# Patient Record
Sex: FEMALE | Race: WHITE | Employment: FULL TIME | URBAN - METROPOLITAN AREA
[De-identification: names, ages, dates, MRNs, and addresses within clinical notes are randomized per-mention and may not be internally consistent; named-entity substitution may affect disease eponyms.]

---

## 2020-12-29 ENCOUNTER — HOSPITAL ENCOUNTER (OUTPATIENT)
Dept: GENERAL RADIOLOGY | Age: 45
Discharge: HOME | End: 2020-12-31
Payer: COMMERCIAL

## 2020-12-29 ENCOUNTER — HOSPITAL ENCOUNTER (OUTPATIENT)
Age: 45
Discharge: HOME | End: 2020-12-31
Payer: COMMERCIAL

## 2020-12-29 DIAGNOSIS — M54.2 CERVICALGIA: ICD-10-CM

## 2020-12-29 PROCEDURE — 72050 X-RAY EXAM NECK SPINE 4/5VWS: CPT

## 2024-02-09 ENCOUNTER — OFFICE VISIT (OUTPATIENT)
Dept: SURGICAL ONCOLOGY | Facility: HOSPITAL | Age: 49
End: 2024-02-09
Payer: COMMERCIAL

## 2024-02-09 VITALS
RESPIRATION RATE: 16 BRPM | OXYGEN SATURATION: 99 % | HEART RATE: 89 BPM | SYSTOLIC BLOOD PRESSURE: 126 MMHG | DIASTOLIC BLOOD PRESSURE: 75 MMHG | WEIGHT: 178.57 LBS | TEMPERATURE: 97.5 F

## 2024-02-09 DIAGNOSIS — C43.9 MELANOMA (MULTI): Primary | ICD-10-CM

## 2024-02-09 PROCEDURE — 99215 OFFICE O/P EST HI 40 MIN: CPT | Mod: 57 | Performed by: SURGERY

## 2024-02-09 PROCEDURE — 99205 OFFICE O/P NEW HI 60 MIN: CPT | Performed by: SURGERY

## 2024-02-09 RX ORDER — DESVENLAFAXINE SUCCINATE 25 MG/1
50 TABLET, EXTENDED RELEASE ORAL
Status: ON HOLD | COMMUNITY
Start: 2023-08-18 | End: 2024-02-19 | Stop reason: ALTCHOICE

## 2024-02-09 RX ORDER — METFORMIN HYDROCHLORIDE 500 MG/5ML
500 SOLUTION ORAL NIGHTLY
Status: ON HOLD | COMMUNITY
Start: 2009-02-09 | End: 2024-02-19 | Stop reason: ALTCHOICE

## 2024-02-09 ASSESSMENT — PAIN SCALES - GENERAL: PAINLEVEL: 0-NO PAIN

## 2024-02-09 NOTE — PROGRESS NOTES
History and Physical    Referring Provider:  Dr. Dora Kevin DO    Chief Complaint:  Right chest melanoma  Right back moderately dysplastic nevus    History of Present Illness:  This is a 48 y.o. female who presents with a right chest melanoma that is 0.9mm in Breslow depth and non-ulcerated.  She had a right chest lesion the appear to have changed.  This was identified by family member as concerning and she had a biopsy with Dr. John that identified a melanoma.  She also had a biopsy on her right back that was moderately dysplastic nevus.  No prior history of skin cancers    Past Medical History:  PCOS  Thyroid goiter  Smoker    Past Surgical History:  Uterine ablation  Neck injections    Medications:  No current outpatient medications     Allergies:  No known drug allergies    Family History:  Mother with multiple myeloma    Social History:   reports that she has been smoking cigarettes. She has been smoking an average of .25 packs per day. She has never used smokeless tobacco. She reports current alcohol use of about 1.0 standard drink of alcohol per week. She reports that she does not use drugs.     Review of Systems:  A complete 12 point review of systems was performed and is negative except as noted in the history of present illness.    Vital Signs:  Vitals:    02/09/24 1302   BP: 126/75   Pulse: 89   Resp: 16   Temp: 36.4 °C (97.5 °F)   SpO2: 99%        Physical Exam:  GEN: No acute distress, Healthy appearing  HEENT: Moist mucus membranes, normocephalic  CARDS: RRR  PULM: No respiratory distress  GI: Soft, non-distended  LYMPH: No palpable lymphadenopathy in bilateral cervical, and right axillary basins  SKIN: Right anterior chest biopsy site without evidence of residual disease.  No surrounding lesions or nodularity.  Right back biopsy site with surrounding macule without any gross concerns  NEURO: No gross sensorimotor deficits  EXT: No arm or leg swelling      Imaging:  I have  personally reviewed the images and the radiologist's report.       Assessment:  This is a 48 y.o. female who presents with a right chest melanoma that was 0.9mm and Non-ulcerated. No clinically palpable lymphadenopathy  Discussed the recommendation for wide excision with consideration of a SLNB.     Plan:  -- Wide excision of the right chest melanoma with 1cm margin and SLNB  ALSO wide excision right back moderately dysplastic nevus  -- The patient asked very appropriate questions that were answered to the best of my ability with the current information at hand. She knows to call with any questions or concerns that arise      Jose Eduardo Nails MD, MPH

## 2024-02-12 ENCOUNTER — LAB REQUISITION (OUTPATIENT)
Dept: DERMATOPATHOLOGY | Facility: CLINIC | Age: 49
End: 2024-02-12
Payer: COMMERCIAL

## 2024-02-12 DIAGNOSIS — L98.8 OTHER SPECIFIED DISORDERS OF THE SKIN AND SUBCUTANEOUS TISSUE: ICD-10-CM

## 2024-02-12 PROCEDURE — 88321 CONSLTJ&REPRT SLD PREP ELSWR: CPT | Performed by: DERMATOLOGY

## 2024-02-12 PROCEDURE — 88321 CONSLTJ&REPRT SLD PREP ELSWR: CPT

## 2024-02-14 ENCOUNTER — TELEPHONE (OUTPATIENT)
Dept: PREADMISSION TESTING | Facility: HOSPITAL | Age: 49
End: 2024-02-14
Payer: COMMERCIAL

## 2024-02-14 NOTE — TELEPHONE ENCOUNTER
Pre-procedure PAT phone assessment completed. Pre-operative and medication instructions reviewed with patient. Instructed to hold metformin evening before surgery. Patient verbalizes understanding of instructions. SURGERY PRE-OPERATIVE INSTRUCTIONS    *You will receive a phone call the day before your procedure  after 2pm, (or the Friday before your surgery if scheduled on a Monday.) Generally the hospital will be calling you with this information after that time.    *You are not to eat after midnight the night before the surgery. You may have 8oz of a clear liquid up until 2 hours prior to arriving to the hospital. The exception is with medications you were instructed to take day of surgery.    *You may take tylenol for pain/discomfort as needed.     *Stop taking all aspirin products, ibuprofen (motrin/advil), naproxen (aleve/naprosyn) for one week prior to surgery.    *Stop taking all vitamins and supplements one week prior to surgery.     *You should not have alcoholic beverages for 24 hours before surgery.     *You should not smoke 24 hours prior to surgery.     *To help prevent surgical infections bathe/shower with Dial soap the evening before surgery.    *You can wear deodorant but no lotion, powder, or perfume/cologne. You should remove all make-up and nail polish at home.    *If you wear glasses, please bring a case for the glasses with you.    *You will be asked to remove dentures and contacts.     *Please leave all valuables at home.    *You should wear loose, comfortable clothing that will accommodate bandages and/or casts.    *You should notify your doctor of any change in your condition (fever, cold, rash, etc). Surgery may need to be re-scheduled until a time you are in better health.    *A responsible adult is required to accompany you to and from the hospital if you are receiving anesthesia or a sedative. Patients are not permitted to drive for 24 hours after anesthesia.     *You can use the   parking if you wish.     *If you have any further questions please call [phone].

## 2024-02-15 ENCOUNTER — ANESTHESIA EVENT (OUTPATIENT)
Dept: OPERATING ROOM | Facility: HOSPITAL | Age: 49
End: 2024-02-15
Payer: COMMERCIAL

## 2024-02-15 DIAGNOSIS — C43.9 MELANOMA OF SKIN (MULTI): Primary | ICD-10-CM

## 2024-02-15 LAB
PATH REPORT.FINAL DX SPEC: NORMAL
PATH REPORT.GROSS SPEC: NORMAL
PATH REPORT.MICROSCOPIC SPEC OTHER STN: NORMAL
PATH REPORT.RELEVANT HX SPEC: NORMAL
PATH REPORT.TOTAL CANCER: NORMAL
PATHOLOGY SYNOPTIC REPORT: NORMAL

## 2024-02-15 NOTE — TUMOR BOARD NOTE
General Patient Information  Name:  Bee Angel  Evaluation #:  1  Conference Date:  2-  YOB: 1975  MRN:  [id number]  Program Physician(s):  David Card  Referring Physician(s):  Jose Eduardo Callejas      Summary   Stage:  Kori ([id number]) ; Melanoma 5 year survival: 99%    Assessment:  Right medial superior chest with a non-ulcerated superficial spreading melanoma, Breslow: 0.6 mm (outside pathology read as a Breslow depth of 0.9 mm).    Recommendation:  WLE with 1 cm margins.    Review Multidisciplinary Cutaneous Oncology Conference recommendation with patient.  Continue routine follow up and total body skin exams with Dilcia Augustin.    Follow Up:  Jose Eduardo Callejas      History and Physical Exam  Dermatologic History:   48 y.o. female with a biopsy of the right medial superior chest on 1- showing a non-ulcerated melanoma, superficial spreading type, Breslow: 0.6 mm (outside pathology read as a Breslow depth of 0.9 mm).    Past Medical History:    Past Medical History:   Diagnosis Date    Goiter     Melanoma (CMS/HCC)     right chest    PCOS (polycystic ovarian syndrome)     Wears contact lenses     Wears glasses        Family History of DNS/MM:  Mother with multiple myeloma    Skin:  Right anterior chest biopsy site without evidence of residual disease.  No surrounding lesions or nodularity.  Right back biopsy site with surrounding macule without any gross concerns.    Lymphatic:  No palpable lymphadenopathy in bilateral cervical, and right axillary basins.      Pathology  Derm Consult: [id number]  4 SLIDES, [facility], #[id number], BX: 1/25/24     A. SKIN, RIGHT MEDIAL SUPERIOR CHEST, SHAVE REMOVAL:   INVASIVE MALIGNANT MELANOMA, EXTENDING TO A DEPTH OF APPROXIMATELY 0.6 MM AND ARISING IN ASSOCIATION WITH A COMPOUND DYSPLASTIC NEVUS (SEE COMMENT):     Comment: The trisected shave specimen reveals two distinct  melanocytic populations.  There is a compound melanocytic proliferation with banal to mild cytology composed of nested melanocytes along the tips of rete, bridging of adjacent rete, and concentric papillary dermal fibrosis.  There are underlying banal appearing well nested melanocytes in the dermis that show a tendency to mature and disperse.  These areas are consistent with a compound dysplastic nevus with mild atypia.  Partially overlapping and extending beyond the precursor compound dysplastic nevus is a proliferation of severely atypical, slightly epithelioid, enlarged melanocytes that are poorly nested along the tips, sides, and over the tops of rete with pagetoid scatter and areas of confluence.  There is a similar population of melanocytes in the dermis, the deepest nest with similar cytology extends to approximately 0.6 mm depth.  Melanoma in situ extends to a peripheral margin and invasive melanoma approaches the deep margin.  The dysplastic nevus extends to a peripheral margin.       The case was reviewed with a second dermatopathologist, Dr. Quique Gilmore, with consensus. See synoptic  summary.     B. SKIN, RIGHT SUPERIOR UPPER BACK, SHAVE REMOVAL:  COMPOUND DYSPLASTIC NEVUS WITH MILD ATYPIA, EXTENDING TO A PERIPHERAL MARGIN IN THESE PLANES OF SECTION.       ** Electronically signed out by CINDY AGUILAR MD **    Procedure  Biopsy, shave   Specimen Laterality  Right     Tumor Site  Skin of trunk: Right medial superior chest          Histologic Type  Superficial spreading melanoma (low-cumulative sun damage (CSD) melanoma)   Maximum Tumor (Breslow) Thickness (Millimeters)  0.6 mm   Ulceration  Not identified   Anatomic (Sameer) Level  IV (melanoma invades reticular dermis)   Mitotic Rate  None identified   Microsatellite(s)  Cannot be determined: Clinical information not given     Lymphovascular Invasion  Not identified   Neurotropism  Not identified   Tumor-Infiltrating Lymphocytes  Present, nonbrisk    Tumor Regression  Not identified   MARGINS     Margin Status for Invasive Melanoma  All margins negative for invasive melanoma   Closest Margin(s) to Invasive Melanoma  Extends within 0.1 mm of the deep margin   Margin Status for Melanoma in situ  Melanoma in situ present at margin   Margin(s) Involved by Melanoma in Situ  Peripheral   PATHOLOGIC STAGE CLASSIFICATION (pTNM, AJCC 8th Edition)     pT Category  pT1a     Additional Findings  Arising in association with a compound dysplastic nevus     Comment(s)  Tumor block in original case #[id number] from [facility] is Block A1     A. Microscopic examination performed.     B. The hematoxylin and eosin-stained sections reveal a compound melanocytic proliferation involving an area of epidermal lentiginous hyperplasia.  There is increased basal layer melanin pigment.  There is scattered mild junctional cytologic atypia.  The junctional melanocytes are predominantly nested at the tips and sides of rete, with bridging of adjacent rete and concentric papillary dermal fibrosis.  There are central, well-nested dermal melanocytes that exhibit maturation and dispersal with descent.  There is no significant underlying solar elastosis.  The junctional component extends beyond the dermal component.  There are scattered papillary dermal melanophages.

## 2024-02-19 ENCOUNTER — HOSPITAL ENCOUNTER (OUTPATIENT)
Facility: HOSPITAL | Age: 49
Setting detail: OUTPATIENT SURGERY
Discharge: HOME | End: 2024-02-19
Attending: SURGERY | Admitting: SURGERY
Payer: COMMERCIAL

## 2024-02-19 ENCOUNTER — APPOINTMENT (OUTPATIENT)
Dept: RADIOLOGY | Facility: HOSPITAL | Age: 49
End: 2024-02-19
Payer: COMMERCIAL

## 2024-02-19 ENCOUNTER — ANESTHESIA (OUTPATIENT)
Dept: OPERATING ROOM | Facility: HOSPITAL | Age: 49
End: 2024-02-19
Payer: COMMERCIAL

## 2024-02-19 ENCOUNTER — PHARMACY VISIT (OUTPATIENT)
Dept: PHARMACY | Facility: CLINIC | Age: 49
End: 2024-02-19
Payer: MEDICARE

## 2024-02-19 ENCOUNTER — TUMOR BOARD CONFERENCE (OUTPATIENT)
Dept: HEMATOLOGY/ONCOLOGY | Facility: HOSPITAL | Age: 49
End: 2024-02-19
Payer: COMMERCIAL

## 2024-02-19 VITALS
OXYGEN SATURATION: 93 % | BODY MASS INDEX: 28.7 KG/M2 | HEIGHT: 66 IN | WEIGHT: 178.57 LBS | SYSTOLIC BLOOD PRESSURE: 106 MMHG | RESPIRATION RATE: 16 BRPM | HEART RATE: 74 BPM | DIASTOLIC BLOOD PRESSURE: 66 MMHG | TEMPERATURE: 97.5 F

## 2024-02-19 DIAGNOSIS — D23.9 DYSPLASTIC NEVUS: ICD-10-CM

## 2024-02-19 DIAGNOSIS — C43.9 MELANOMA (MULTI): Primary | ICD-10-CM

## 2024-02-19 DIAGNOSIS — C43.52: ICD-10-CM

## 2024-02-19 LAB — PREGNANCY TEST URINE, POC: NEGATIVE

## 2024-02-19 PROCEDURE — 2720000007 HC OR 272 NO HCPCS: Performed by: SURGERY

## 2024-02-19 PROCEDURE — 7100000010 HC PHASE TWO TIME - EACH INCREMENTAL 1 MINUTE: Performed by: SURGERY

## 2024-02-19 PROCEDURE — 81025 URINE PREGNANCY TEST: CPT | Performed by: ANESTHESIOLOGY

## 2024-02-19 PROCEDURE — 2500000004 HC RX 250 GENERAL PHARMACY W/ HCPCS (ALT 636 FOR OP/ED): Performed by: SURGERY

## 2024-02-19 PROCEDURE — 3600000003 HC OR TIME - INITIAL BASE CHARGE - PROCEDURE LEVEL THREE: Performed by: SURGERY

## 2024-02-19 PROCEDURE — 2500000005 HC RX 250 GENERAL PHARMACY W/O HCPCS: Performed by: SURGERY

## 2024-02-19 PROCEDURE — 12034 INTMD RPR S/TR/EXT 7.6-12.5: CPT | Performed by: SURGERY

## 2024-02-19 PROCEDURE — 11603 EXC TR-EXT MAL+MARG 2.1-3 CM: CPT | Performed by: SURGERY

## 2024-02-19 PROCEDURE — 3600000008 HC OR TIME - EACH INCREMENTAL 1 MINUTE - PROCEDURE LEVEL THREE: Performed by: SURGERY

## 2024-02-19 PROCEDURE — 2500000004 HC RX 250 GENERAL PHARMACY W/ HCPCS (ALT 636 FOR OP/ED): Performed by: ANESTHESIOLOGY

## 2024-02-19 PROCEDURE — 88305 TISSUE EXAM BY PATHOLOGIST: CPT | Performed by: DERMATOLOGY

## 2024-02-19 PROCEDURE — 11402 EXC TR-EXT B9+MARG 1.1-2 CM: CPT | Performed by: SURGERY

## 2024-02-19 PROCEDURE — RXMED WILLOW AMBULATORY MEDICATION CHARGE

## 2024-02-19 PROCEDURE — 3700000002 HC GENERAL ANESTHESIA TIME - EACH INCREMENTAL 1 MINUTE: Performed by: SURGERY

## 2024-02-19 PROCEDURE — 3700000001 HC GENERAL ANESTHESIA TIME - INITIAL BASE CHARGE: Performed by: SURGERY

## 2024-02-19 PROCEDURE — 7100000009 HC PHASE TWO TIME - INITIAL BASE CHARGE: Performed by: SURGERY

## 2024-02-19 PROCEDURE — 2500000004 HC RX 250 GENERAL PHARMACY W/ HCPCS (ALT 636 FOR OP/ED): Performed by: REGISTERED NURSE

## 2024-02-19 PROCEDURE — 2500000005 HC RX 250 GENERAL PHARMACY W/O HCPCS: Performed by: REGISTERED NURSE

## 2024-02-19 PROCEDURE — A11603 PR EXC SKIN MALIG 2.1-3 CM TRUNK,ARM,LEG: Performed by: REGISTERED NURSE

## 2024-02-19 RX ORDER — CLINDAMYCIN PHOSPHATE 900 MG/50ML
INJECTION, SOLUTION INTRAVENOUS AS NEEDED
Status: DISCONTINUED | OUTPATIENT
Start: 2024-02-19 | End: 2024-02-19

## 2024-02-19 RX ORDER — LIDOCAINE HCL/PF 100 MG/5ML
SYRINGE (ML) INTRAVENOUS AS NEEDED
Status: DISCONTINUED | OUTPATIENT
Start: 2024-02-19 | End: 2024-02-19

## 2024-02-19 RX ORDER — MIDAZOLAM HYDROCHLORIDE 1 MG/ML
INJECTION INTRAMUSCULAR; INTRAVENOUS AS NEEDED
Status: DISCONTINUED | OUTPATIENT
Start: 2024-02-19 | End: 2024-02-19

## 2024-02-19 RX ORDER — DESVENLAFAXINE 50 MG/1
50 TABLET, EXTENDED RELEASE ORAL DAILY
COMMUNITY

## 2024-02-19 RX ORDER — PROPOFOL 10 MG/ML
INJECTION, EMULSION INTRAVENOUS AS NEEDED
Status: DISCONTINUED | OUTPATIENT
Start: 2024-02-19 | End: 2024-02-19

## 2024-02-19 RX ORDER — ONDANSETRON HYDROCHLORIDE 2 MG/ML
INJECTION, SOLUTION INTRAVENOUS AS NEEDED
Status: DISCONTINUED | OUTPATIENT
Start: 2024-02-19 | End: 2024-02-19

## 2024-02-19 RX ORDER — INDOMETHACIN 25 MG/1
CAPSULE ORAL AS NEEDED
Status: DISCONTINUED | OUTPATIENT
Start: 2024-02-19 | End: 2024-02-19 | Stop reason: HOSPADM

## 2024-02-19 RX ORDER — PROPOFOL 10 MG/ML
INJECTION, EMULSION INTRAVENOUS CONTINUOUS PRN
Status: DISCONTINUED | OUTPATIENT
Start: 2024-02-19 | End: 2024-02-19

## 2024-02-19 RX ORDER — BUPIVACAINE HYDROCHLORIDE 5 MG/ML
INJECTION, SOLUTION PERINEURAL AS NEEDED
Status: DISCONTINUED | OUTPATIENT
Start: 2024-02-19 | End: 2024-02-19 | Stop reason: HOSPADM

## 2024-02-19 RX ORDER — LIDOCAINE HYDROCHLORIDE 10 MG/ML
INJECTION INFILTRATION; PERINEURAL AS NEEDED
Status: DISCONTINUED | OUTPATIENT
Start: 2024-02-19 | End: 2024-02-19 | Stop reason: HOSPADM

## 2024-02-19 RX ORDER — PHENYLEPHRINE HCL IN 0.9% NACL 0.4MG/10ML
SYRINGE (ML) INTRAVENOUS AS NEEDED
Status: DISCONTINUED | OUTPATIENT
Start: 2024-02-19 | End: 2024-02-19

## 2024-02-19 RX ORDER — CLINDAMYCIN PHOSPHATE 600 MG/50ML
600 INJECTION, SOLUTION INTRAVENOUS ONCE
Status: CANCELLED | OUTPATIENT
Start: 2024-02-19

## 2024-02-19 RX ORDER — METFORMIN HYDROCHLORIDE 500 MG/1
1000 TABLET ORAL DAILY
COMMUNITY

## 2024-02-19 RX ORDER — DEXAMETHASONE SODIUM PHOSPHATE 4 MG/ML
INJECTION, SOLUTION INTRA-ARTICULAR; INTRALESIONAL; INTRAMUSCULAR; INTRAVENOUS; SOFT TISSUE AS NEEDED
Status: DISCONTINUED | OUTPATIENT
Start: 2024-02-19 | End: 2024-02-19

## 2024-02-19 RX ORDER — FENTANYL CITRATE 50 UG/ML
INJECTION, SOLUTION INTRAMUSCULAR; INTRAVENOUS AS NEEDED
Status: DISCONTINUED | OUTPATIENT
Start: 2024-02-19 | End: 2024-02-19

## 2024-02-19 RX ORDER — TRAMADOL HYDROCHLORIDE 50 MG/1
50 TABLET ORAL EVERY 6 HOURS PRN
Qty: 6 TABLET | Refills: 0 | Status: SHIPPED | OUTPATIENT
Start: 2024-02-19 | End: 2024-03-12 | Stop reason: WASHOUT

## 2024-02-19 RX ORDER — CEFAZOLIN SODIUM 2 G/100ML
2 INJECTION, SOLUTION INTRAVENOUS EVERY 8 HOURS
Status: DISCONTINUED | OUTPATIENT
Start: 2024-02-19 | End: 2024-02-19 | Stop reason: HOSPADM

## 2024-02-19 RX ORDER — KETOROLAC TROMETHAMINE 30 MG/ML
INJECTION, SOLUTION INTRAMUSCULAR; INTRAVENOUS AS NEEDED
Status: DISCONTINUED | OUTPATIENT
Start: 2024-02-19 | End: 2024-02-19

## 2024-02-19 RX ORDER — ACETAMINOPHEN 325 MG/1
650 TABLET ORAL EVERY 4 HOURS PRN
Status: DISCONTINUED | OUTPATIENT
Start: 2024-02-19 | End: 2024-02-19 | Stop reason: HOSPADM

## 2024-02-19 RX ORDER — LIDOCAINE HYDROCHLORIDE 10 MG/ML
0.1 INJECTION, SOLUTION EPIDURAL; INFILTRATION; INTRACAUDAL; PERINEURAL ONCE
Status: DISCONTINUED | OUTPATIENT
Start: 2024-02-19 | End: 2024-02-19 | Stop reason: HOSPADM

## 2024-02-19 RX ORDER — ALBUTEROL SULFATE 0.83 MG/ML
2.5 SOLUTION RESPIRATORY (INHALATION) ONCE AS NEEDED
Status: DISCONTINUED | OUTPATIENT
Start: 2024-02-19 | End: 2024-02-19 | Stop reason: HOSPADM

## 2024-02-19 RX ORDER — SODIUM CHLORIDE, SODIUM LACTATE, POTASSIUM CHLORIDE, CALCIUM CHLORIDE 600; 310; 30; 20 MG/100ML; MG/100ML; MG/100ML; MG/100ML
100 INJECTION, SOLUTION INTRAVENOUS CONTINUOUS
Status: DISCONTINUED | OUTPATIENT
Start: 2024-02-19 | End: 2024-02-19 | Stop reason: HOSPADM

## 2024-02-19 RX ORDER — ONDANSETRON HYDROCHLORIDE 2 MG/ML
8 INJECTION, SOLUTION INTRAVENOUS ONCE
Status: DISCONTINUED | OUTPATIENT
Start: 2024-02-19 | End: 2024-02-19 | Stop reason: HOSPADM

## 2024-02-19 RX ADMIN — KETOROLAC TROMETHAMINE 15 MG: 30 INJECTION, SOLUTION INTRAMUSCULAR; INTRAVENOUS at 11:19

## 2024-02-19 RX ADMIN — Medication 200 MCG: at 11:12

## 2024-02-19 RX ADMIN — SODIUM CHLORIDE, POTASSIUM CHLORIDE, SODIUM LACTATE AND CALCIUM CHLORIDE 100 ML/HR: 600; 310; 30; 20 INJECTION, SOLUTION INTRAVENOUS at 08:45

## 2024-02-19 RX ADMIN — PROPOFOL 40 MG: 10 INJECTION, EMULSION INTRAVENOUS at 10:39

## 2024-02-19 RX ADMIN — Medication 200 MCG: at 11:21

## 2024-02-19 RX ADMIN — LIDOCAINE HYDROCHLORIDE 60 MG: 20 INJECTION, SOLUTION INTRAVENOUS at 10:37

## 2024-02-19 RX ADMIN — PROPOFOL 250 MCG/KG/MIN: 10 INJECTION, EMULSION INTRAVENOUS at 10:39

## 2024-02-19 RX ADMIN — DEXAMETHASONE SODIUM PHOSPHATE 4 MG: 4 INJECTION INTRA-ARTICULAR; INTRALESIONAL; INTRAMUSCULAR; INTRAVENOUS; SOFT TISSUE at 10:41

## 2024-02-19 RX ADMIN — CLINDAMYCIN PHOSPHATE 900 MG: 900 INJECTION, SOLUTION INTRAVENOUS at 10:41

## 2024-02-19 RX ADMIN — PROPOFOL 80 MG: 10 INJECTION, EMULSION INTRAVENOUS at 10:37

## 2024-02-19 RX ADMIN — Medication 120 MCG: at 11:07

## 2024-02-19 RX ADMIN — Medication 160 MCG: at 11:02

## 2024-02-19 RX ADMIN — MIDAZOLAM HYDROCHLORIDE 2 MG: 1 INJECTION, SOLUTION INTRAMUSCULAR; INTRAVENOUS at 10:32

## 2024-02-19 RX ADMIN — Medication 160 MCG: at 11:28

## 2024-02-19 RX ADMIN — ONDANSETRON 4 MG: 2 INJECTION, SOLUTION INTRAMUSCULAR; INTRAVENOUS at 11:19

## 2024-02-19 RX ADMIN — FENTANYL CITRATE 50 MCG: 50 INJECTION, SOLUTION INTRAMUSCULAR; INTRAVENOUS at 10:41

## 2024-02-19 RX ADMIN — Medication 120 MCG: at 10:58

## 2024-02-19 SDOH — HEALTH STABILITY: MENTAL HEALTH: CURRENT SMOKER: 0

## 2024-02-19 ASSESSMENT — PAIN - FUNCTIONAL ASSESSMENT
PAIN_FUNCTIONAL_ASSESSMENT: 0-10
PAIN_FUNCTIONAL_ASSESSMENT: 0-10

## 2024-02-19 ASSESSMENT — PAIN SCALES - GENERAL
PAINLEVEL_OUTOF10: 0 - NO PAIN

## 2024-02-19 NOTE — ANESTHESIA POSTPROCEDURE EVALUATION
Patient: Bee Angel    Procedure Summary       Date: 02/19/24 Room / Location: [facility] OR 06 / [facility]    Anesthesia Start: 1033 Anesthesia Stop: 1147    Procedures:       WIDE EXCISION MELANOMA RIGHT CHEST (Right)      EXCISION LESION RIGHT BACK NEVUS (Right) Diagnosis:       Melanoma (CMS/HCC)      Dysplastic nevus      (Melanoma (CMS/HCC) [C43.9])    Surgeons: Jose Eduardo Nails MD MPH Responsible Provider: AKIRA Strickland    Anesthesia Type: general ASA Status: 2            Anesthesia Type: general    Vitals Value Taken Time   /66 02/19/24 1212   Temp 36.4 °C (97.5 °F) 02/19/24 1139   Pulse 74 02/19/24 1209   Resp 16 02/19/24 1209   SpO2 96 % 02/19/24 1212   Vitals shown include unvalidated device data.    Anesthesia Post Evaluation    Patient location during evaluation: PACU  Patient participation: complete - patient participated  Level of consciousness: awake  Pain management: adequate  Multimodal analgesia pain management approach  Airway patency: patent  Two or more strategies used to mitigate risk of obstructive sleep apnea  Cardiovascular status: acceptable  Respiratory status: acceptable  Hydration status: acceptable  Postoperative Nausea and Vomiting: none        There were no known notable events for this encounter.

## 2024-02-19 NOTE — OP NOTE
WIDE EXCISION MELANOMA RIGHT CHEST (R), EXCISION LESION RIGHT BACK NEVUS (R) Operative Note     Date: 2024  OR Location: [facility] OR    Name: Bee Angel, : 1975, Age: 48 y.o., MRN: [id number], Sex: female    Diagnosis  Pre-op Diagnosis     * Melanoma (CMS/HCC) [C43.9] Post-op Diagnosis     * Melanoma (CMS/HCC) [C43.9]     Procedures  WIDE EXCISION MELANOMA RIGHT CHEST  01517 - GA EXCISION MAL LESION TRUNK/ARM/LEG 2.1-3.0 CM    EXCISION LESION RIGHT BACK NEVUS  77912 - GA EXC B9 LESION MRGN XCP SK TG T/A/L 1.1-2.0 CM    Complex Closure of a 7.5cm wound on the right chest.     Intermediate closure of a 3.2cm wound on the right back    Surgeons      * Jose Eduardo Nails - Primary    Resident/Fellow/Other Assistant:  Surgeon(s) and Role:    Procedure Summary  Anesthesia: Consult  ASA: II  Anesthesia Staff: CRNA: JUNE Strickland-CRNA  Estimated Blood Loss: 5mL  Intra-op Medications: Administrations occurring from 0930 to 1130 on 24:  * No intraprocedure medications in log *           Anesthesia Record               Intraprocedure I/O Totals          Intake    Propofol Drip 0.00 mL    The total shown is the total volume documented since Anesthesia Start was filed.    lactated Ringer's infusion 900.00 mL    Total Intake 900 mL       Output    Est. Blood Loss 5 mL    Total Output 5 mL       Net    Net Volume 895 mL          Specimen:   ID Type Source Tests Collected by Time   1 : RIGHT BACK NEVUS SHORT SUPERIOR 12 O'CLOCK, LONG LATERAL 3 O'CLOCK Tissue SKIN EXCISIONAL BIOPSY DERMPATH LAB- DERMATOPATHOLOGY Jose Eduardo Nails MD MPH 2024 1102   2 : RIGHT CHEST MELANOMA SHORT SUPERIOR 12 O'CLOCK, LONG LATERAL 9 O'CLOCK Tissue SKIN EXCISIONAL BIOPSY DERMPATH LAB- DERMATOPATHOLOGY Jose Eduardo Nails MD MPH 2024 1127        Staff:   Circulator: Stuart Arauz RN  Scrub Person: Kiera Perry RN      Findings: Right chest and Right back biopsy sites.     Indications: Bee Angel is an 48 y.o.  female who is having surgery for Melanoma (CMS/[facility]) [C43.9].  The patient presented with a right chest melanoma that was initially thought to be 0.9 mm in Breslow depth however dermatopathology review identified the presence of a benign mole with the invasive component measuring 0.6mm in Breslow depth. This was discussed extensively with pathology and the recommendation therefore was wide excision with 1cm margin alone. Additionally, the right back moderately dysplastic nevus will be excised at the same time.    The patient was seen in the preoperative area. The risks, benefits, complications, treatment options, non-operative alternatives, expected recovery and outcomes were discussed with the patient. The possibilities of reaction to medication, pulmonary aspiration, injury to surrounding structures, bleeding, recurrent infection, the need for additional procedures, failure to diagnose a condition, and creating a complication requiring transfusion or operation were discussed with the patient. The patient concurred with the proposed plan, giving informed consent.  The site of surgery was properly noted/marked if necessary per policy. The patient has been actively warmed in preoperative area. Preoperative antibiotics have been ordered and given within 1 hours of incision. Venous thrombosis prophylaxis have been ordered including bilateral sequential compression devices    Procedure Details: The patient was brought to the OR and placed on the OR table in supine position. MAC anesthesia was induced without difficulty. The patient was positioned in left lateral decubitus position with all pressure points padded.The surgical sites were prepped and draped in sterile fashion.    The right back dysplastic nevus biopsy site was marked with a 2mm margin and extended in elliptical fashion resulting in a 1.2x3.2cm ellipse. The skin was incised sharply and dissection carried into the subcutaneous tissues. The specimen was marked  with a short stitch superiorly at 12:00, and a long stitch laterally at 3:00. Skin and subcutaneous flaps were raised for <1cm in all directions. The wound was irrigated and hemostasis was obtained. The wound was closed in layers with 3-0 vicryl for the deep dermal layer, and 4-0 monocryl for the skin. Skin glue was used as a dressing.     At this point, the primary melanoma on the right anterior chest was addressed. A 1cm margin was marked around the biopsy site. This marking was extended in elliptical fashion resulting in a 2.5x7.5cm marking. The skin was incised sharply and dissection carried to but not through the underlying muscular fascia. The specimen was marked with a short stitch superiorly at 12:00, and a long stitch laterally at 9:00. Skin and subcutaneous flaps were raised for 1cm in all directions. The wound was irrigated and hemostasis was obtained. The wound was closed in layers with 3-0 vicryl for the scarpas and deep dermal layers, and 4-0 monocryl for the skin. Skin glue was used as a dressing.     Complications:  None; patient tolerated the procedure well.    Disposition: PACU - hemodynamically stable.  Condition: stable     Wide Local Excision for Primary Cutaneous Melanoma  Operation performed with curative intent Yes   Original Breslow thickness of the lesion 0.6 mm (to the tenth of a millimeter)   Clinical margin width 1 cm   Depth of excision Full-thickness skin/subcutaneous tissue down to fascia (melanoma)       Attending Attestation: I was present and scrubbed for the entire procedure.    Jose Eduardo Nails  Phone Number: [phone]

## 2024-02-19 NOTE — INTERVAL H&P NOTE
H&P reviewed. After internal pathology review, the biopsy was found to have a benign nevus component and the invasive disease was only 0.6mm in Breslow depth. This was confirmed by two providers and as such is recommended for excision alone. I discussed this with the patient and she understands the rationale for this recommendation.

## 2024-02-19 NOTE — ANESTHESIA PREPROCEDURE EVALUATION
Patient: Bee Angel    Procedure Information       Date/Time: 02/19/24 0930    Procedures:       WIDE EXCISION MELANOMA RIGHT CHEST (Right)      EXCISION LESION RIGHT BACK NEVUS (Right)    Location: [facility] OR 06 / [facility]    Surgeons: Jose Eduardo Nails MD MPH          Vitals:    02/19/24 0818   BP: 144/81   Pulse: 94   Resp: 16   Temp: 36 °C (96.8 °F)   SpO2: 96%       Past Surgical History:   Procedure Laterality Date    OTHER SURGICAL HISTORY      uterine ablation    OTHER SURGICAL HISTORY      cervical injections    TONSILLECTOMY      and adenoidectomy     Past Medical History:   Diagnosis Date    Goiter     Melanoma (CMS/HCC)     right chest    PCOS (polycystic ovarian syndrome)     Wears contact lenses     Wears glasses        Current Facility-Administered Medications:     ceFAZolin in dextrose (iso-os) (Ancef) IVPB 2 g, 2 g, intravenous, q8h, Jose Eduardo Nails MD MPH    lactated Ringer's infusion, 100 mL/hr, intravenous, Continuous, Enio Douglas MD, Last Rate: 100 mL/hr at 02/19/24 0845, 100 mL/hr at 02/19/24 0845  Prior to Admission medications    Medication Sig Start Date End Date Taking? Authorizing Provider   desvenlafaxine 50 mg 24 hr tablet Take 1 tablet (50 mg) by mouth once daily. Do not crush, chew, or split.   Yes Historical Provider, MD   metFORMIN (Glucophage) 500 mg tablet Take 2 tablets (1,000 mg) by mouth once daily.   Yes Historical Provider, MD   desvenlafaxine succinate (Pristiq) 25 mg 24 hour tablet Take 2 tablets (50 mg) by mouth once daily. 8/18/23 2/19/24  Historical Provider, MD   metFORMIN (Glucophage) 500 mg/5 mL solution oral solution Take 5 mL (500 mg) by mouth once daily at bedtime. 2/9/09 2/19/24  Historical Provider, MD     Allergies   Allergen Reactions    Biaxin [Clarithromycin] Hives and Other     Yeast infection     Ceclor [Cefaclor] Hives and Other     Yeast infection     Social History     Tobacco Use    Smoking status: Every Day     Packs/day: .25      "Types: Cigarettes    Smokeless tobacco: Never   Substance Use Topics    Alcohol use: Yes     Alcohol/week: 1.0 standard drink of alcohol     Types: 1 Shots of liquor per week     Comment: occasional         Chemistry    No results found for: \"NA\", \"K\", \"CL\", \"CO2\", \"BUN\", \"CREATININE\", \"GLU\" No results found for: \"CALCIUM\", \"ALKPHOS\", \"AST\", \"ALT\", \"BILITOT\"       No results found for: \"WBC\", \"HGB\", \"HCT\", \"PLT\"  No results found for: \"PROTIME\", \"PTT\", \"INR\"  No results found for this or any previous visit (from the past 4464 hour(s)).  No results found for this or any previous visit from the past 1095 days.      Relevant Problems   No relevant active problems       Clinical information reviewed:    Allergies  Meds  Problems              NPO Detail:  NPO/Void Status  Date of Last Liquid: 02/18/24  Time of Last Liquid: 2030  Date of Last Solid: 02/18/24  Time of Last Solid: 2030  Last Intake Type: Clear fluids         Physical Exam    Airway  Mallampati: II     Cardiovascular - normal exam     Dental    Pulmonary    Abdominal            Anesthesia Plan    History of general anesthesia?: yes  History of complications of general anesthesia?: no    ASA 2     general     The patient is not a current smoker.  Patient was not previously instructed to abstain from smoking on day of procedure.  Patient did not smoke on day of procedure.  Education provided regarding risk of obstructive sleep apnea.  intravenous induction   Anesthetic plan and risks discussed with patient.    Plan discussed with CRNA.      "

## 2024-02-22 DIAGNOSIS — C43.9 MELANOMA OF SKIN (MULTI): Primary | ICD-10-CM

## 2024-02-22 LAB
LABORATORY COMMENT REPORT: NORMAL
PATH REPORT.FINAL DX SPEC: NORMAL
PATH REPORT.GROSS SPEC: NORMAL
PATH REPORT.MICROSCOPIC SPEC OTHER STN: NORMAL
PATH REPORT.RELEVANT HX SPEC: NORMAL
PATH REPORT.TOTAL CANCER: NORMAL

## 2024-02-22 NOTE — TUMOR BOARD NOTE
General Patient Information  Name:  Bee Angel  Evaluation #:  2  Conference Date:  2-  YOB: 1975  MRN:  [id number]  Program Physician(s):  David Card  Referring Physician(s):  Jose Eduardo Callejas      Summary   Stage:  [place] ([id number]) ; Melanoma 5 year survival: 99%    Assessment:  Right medial superior chest with a non-ulcerated superficial spreading melanoma, Breslow: 0.6 mm (outside pathology read as a Breslow depth of 0.9 mm).  S/p WLE with 1 cm margins.  Adequately surgically treated.    Recommendation:  Annual H&P.    Review Multidisciplinary Cutaneous Oncology Conference recommendation with patient.  Continue routine follow up and total body skin exams with Dilcia Augustin.    Follow Up:  Jose Eduardo Callejas      History and Physical Exam  Dermatologic History:   48 y.o. female with a biopsy of the right medial superior chest on 1- showing a non-ulcerated melanoma, superficial spreading type, Breslow: 0.6 mm (outside pathology read as a Breslow depth of 0.9 mm).  S/p on 2- WLE with 1 cm margins showing mildly dysplastic junctional nevus and changes consistent with previous procedure, inked margins free in planes of sections examined; without residual atypical melanocytic neoplasm seen.    Past Medical History:    Past Medical History:   Diagnosis Date    Goiter     Melanoma (CMS/HCC)     right chest    PCOS (polycystic ovarian syndrome)     Wears contact lenses     Wears glasses        Family History of DNS/MM:  Mother with multiple myeloma    Skin:  Right anterior chest biopsy site without evidence of residual disease.  No surrounding lesions or nodularity.  Right back biopsy site with surrounding macule without any gross concerns.    Lymphatic:  No palpable lymphadenopathy in bilateral cervical, and right axillary basins.      Pathology  Dermatopathology- DERM LAB: [id number]  Collected 2/19/2024 11:02    A. SKIN, RIGHT BACK NEVUS  SHORT SUPERIOR 12 O'CLOCK, LONG LATERAL 3 O'CLOCK, EXCISIONAL BIOPSY:  MILDLY DYSPLASTIC JUNCTIONAL NEVUS AND CHANGES CONSISTENT WITH PREVIOUS PROCEDURE, INKED MARGINS FREE IN PLANES OF SECTIONS EXAMINED.     B. SKIN, RIGHT CHEST MELANOMA SHORT SUPERIOR 12 O'CLOCK, LONG LATERAL 9 O'CLOCK, EXCISIONAL BIOPSY:  CHANGES CONSISTENT WITH PREVIOUS PROCEDURE, INKED MARGINS FREE IN THE PLANES OF SECTIONS EXAMINED, WITHOUT RESIDUAL ATYPICAL MELANOCYTIC NEOPLASM SEEN.     ** Electronically signed out by Quique Gilmore MD **    A. Microscopic analysis shows an asymmetric proliferation of melanocytes, with lentiginous hyperplasia of epidermis.  In addition to melanocytes that  nest irregularly along the dermal-epidermal junction, other features of dysplasia include melanocyte bridging, papillary dermal fibroplasia, melanophages, and inflammatory cells in papillary dermis.  The melanocytes show random cytologic atypia.     B. Microscopic examination reveals a specimen that extends into the subcutaneous fat. An area with horizontally oriented collagen and vertically oriented vessels is present.  A step section was performed.    _____________________________________________________________________________________________________________    Derm Consult: [id number]  4 SLIDES, [facility], #[id number], BX: 1/25/24     A. SKIN, RIGHT MEDIAL SUPERIOR CHEST, SHAVE REMOVAL:   INVASIVE MALIGNANT MELANOMA, EXTENDING TO A DEPTH OF APPROXIMATELY 0.6 MM AND ARISING IN ASSOCIATION WITH A COMPOUND DYSPLASTIC NEVUS (SEE COMMENT):     Comment: The trisected shave specimen reveals two distinct melanocytic populations.  There is a compound melanocytic proliferation with banal to mild cytology composed of nested melanocytes along the tips of rete, bridging of adjacent rete, and concentric papillary dermal fibrosis.  There are underlying banal appearing well nested melanocytes in the dermis that show a tendency to mature and  disperse.  These areas are consistent with a compound dysplastic nevus with mild atypia.  Partially overlapping and extending beyond the precursor compound dysplastic nevus is a proliferation of severely atypical, slightly epithelioid, enlarged melanocytes that are poorly nested along the tips, sides, and over the tops of rete with pagetoid scatter and areas of confluence.  There is a similar population of melanocytes in the dermis, the deepest nest with similar cytology extends to approximately 0.6 mm depth.  Melanoma in situ extends to a peripheral margin and invasive melanoma approaches the deep margin.  The dysplastic nevus extends to a peripheral margin.       The case was reviewed with a second dermatopathologist, Dr. Quique Gilmore, with consensus. See synoptic  summary.     B. SKIN, RIGHT SUPERIOR UPPER BACK, SHAVE REMOVAL:  COMPOUND DYSPLASTIC NEVUS WITH MILD ATYPIA, EXTENDING TO A PERIPHERAL MARGIN IN THESE PLANES OF SECTION.       ** Electronically signed out by CINDY AGUILAR MD **    Procedure  Biopsy, shave   Specimen Laterality  Right     Tumor Site  Skin of trunk: Right medial superior chest          Histologic Type  Superficial spreading melanoma (low-cumulative sun damage (CSD) melanoma)   Maximum Tumor (Breslow) Thickness (Millimeters)  0.6 mm   Ulceration  Not identified   Anatomic (Sameer) Level  IV (melanoma invades reticular dermis)   Mitotic Rate  None identified   Microsatellite(s)  Cannot be determined: Clinical information not given     Lymphovascular Invasion  Not identified   Neurotropism  Not identified   Tumor-Infiltrating Lymphocytes  Present, nonbrisk   Tumor Regression  Not identified   MARGINS     Margin Status for Invasive Melanoma  All margins negative for invasive melanoma   Closest Margin(s) to Invasive Melanoma  Extends within 0.1 mm of the deep margin   Margin Status for Melanoma in situ  Melanoma in situ present at margin   Margin(s) Involved by Melanoma in Situ  Peripheral    PATHOLOGIC STAGE CLASSIFICATION (pTNM, AJCC 8th Edition)     pT Category  pT1a     Additional Findings  Arising in association with a compound dysplastic nevus     Comment(s)  Tumor block in original case #[id number] from [facility] is Block A1     A. Microscopic examination performed.     B. The hematoxylin and eosin-stained sections reveal a compound melanocytic proliferation involving an area of epidermal lentiginous hyperplasia.  There is increased basal layer melanin pigment.  There is scattered mild junctional cytologic atypia.  The junctional melanocytes are predominantly nested at the tips and sides of rete, with bridging of adjacent rete and concentric papillary dermal fibrosis.  There are central, well-nested dermal melanocytes that exhibit maturation and dispersal with descent.  There is no significant underlying solar elastosis.  The junctional component extends beyond the dermal component.  There are scattered papillary dermal melanophages.

## 2024-02-26 ENCOUNTER — TUMOR BOARD CONFERENCE (OUTPATIENT)
Dept: HEMATOLOGY/ONCOLOGY | Facility: HOSPITAL | Age: 49
End: 2024-02-26
Payer: COMMERCIAL

## 2024-03-12 ENCOUNTER — OFFICE VISIT (OUTPATIENT)
Dept: SURGICAL ONCOLOGY | Facility: CLINIC | Age: 49
End: 2024-03-12
Payer: COMMERCIAL

## 2024-03-12 VITALS
RESPIRATION RATE: 18 BRPM | DIASTOLIC BLOOD PRESSURE: 87 MMHG | SYSTOLIC BLOOD PRESSURE: 115 MMHG | TEMPERATURE: 97.9 F | HEART RATE: 81 BPM | OXYGEN SATURATION: 97 %

## 2024-03-12 DIAGNOSIS — C43.52: Primary | ICD-10-CM

## 2024-03-12 PROCEDURE — 99024 POSTOP FOLLOW-UP VISIT: CPT | Performed by: SURGERY

## 2024-03-12 ASSESSMENT — PATIENT HEALTH QUESTIONNAIRE - PHQ9
2. FEELING DOWN, DEPRESSED OR HOPELESS: NOT AT ALL
SUM OF ALL RESPONSES TO PHQ9 QUESTIONS 1 AND 2: 0
1. LITTLE INTEREST OR PLEASURE IN DOING THINGS: NOT AT ALL

## 2024-03-12 ASSESSMENT — COLUMBIA-SUICIDE SEVERITY RATING SCALE - C-SSRS
1. IN THE PAST MONTH, HAVE YOU WISHED YOU WERE DEAD OR WISHED YOU COULD GO TO SLEEP AND NOT WAKE UP?: NO
6. HAVE YOU EVER DONE ANYTHING, STARTED TO DO ANYTHING, OR PREPARED TO DO ANYTHING TO END YOUR LIFE?: NO
2. HAVE YOU ACTUALLY HAD ANY THOUGHTS OF KILLING YOURSELF?: NO

## 2024-03-12 ASSESSMENT — ENCOUNTER SYMPTOMS
OCCASIONAL FEELINGS OF UNSTEADINESS: 0
LOSS OF SENSATION IN FEET: 0
DEPRESSION: 0

## 2024-03-12 ASSESSMENT — PAIN SCALES - GENERAL: PAINLEVEL: 0-NO PAIN

## 2024-03-12 NOTE — PROGRESS NOTES
Postoperative Visit    Referring Provider:  Dr. Dora Kevin,     Chief Complaint:  Right chest melanoma  Right back moderately dysplastic nevus    History of Present Illness:  This is a 48 y.o. female who presents with a right chest melanoma that is 0.6mm in Breslow depth and non-ulcerated.  At the outside pathology read reported a 0.9 mm Breslow depth melanoma however reviewing  dermatopathology described the depth to be thinner with an associated nevus affecting the measurement and suggesting that it could be thicker.  This was reviewed with multiple dermatopathologist and the recommendation from our tumor board was to treat this as a very thin melanoma of 0.6 mm in Breslow depth.    She had a right chest lesion the appear to have changed.  This was identified by family member as concerning and she had a biopsy with Dr. John that identified a melanoma.  She also had a biopsy on her right back that was moderately dysplastic nevus.  No prior history of skin cancers    Surgery 2/19/2024-wide excision right chest melanoma with a 1 cm margin; narrow excision right back nevus  Pathology-no residual melanocytic neoplasm, margins clear; right back margins clear.    3/12/2024-postop visit.  Recovering well.  Has minimized activity in hopes of healing very well.  She has continued to wear a supportive bra and the skin has remained very well-approximated and is healing appropriately.  No concerns    Review of Systems:  A complete 12 point review of systems was performed and is negative except as noted in the history of present illness.    Vital Signs:  Vitals:    03/12/24 1324   BP: 115/87   Pulse: 81   Resp: 18   Temp: 36.6 °C (97.9 °F)   SpO2: 97%        Physical Exam:  GEN: No acute distress, Healthy appearing  HEENT: Moist mucus membranes, normocephalic  CARDS: RRR  PULM: No respiratory distress  SKIN: Right anterior chest incision well-approximated without evidence of underlying fluid  collections..  Right back incision healing well   NEURO: No gross sensorimotor deficits  EXT: No arm or leg swelling      Imaging:  I have personally reviewed the images and the radiologist's report.    Assessment:  This is a 48 y.o. female who presents with a right chest melanoma that was 0.6mm and Non-ulcerated. No clinically palpable lymphadenopathy.  Also right back moderately dysplastic nevus.  Both were excised.  [place] lymph node biopsy was not performed as it was not recommended for the final depth of this melanoma.  Stage IA    Plan:  -- The patient is recovering well.  Ongoing surveillance will occur with her dermatologist primarily.  I instructed the patient on self gary examinations on occasion and to alert her dermatologist, PCP, or myself with any concerns.  I will be happy to help in any way in that circumstance.  -- The patient can follow with me on an as-needed basis.  -- The patient asked very appropriate questions that were answered to the best of my ability with the current information at hand. She knows to call with any questions or concerns that arise      Jose Eduardo Nails MD, MPH

## (undated) DEVICE — SOLUTION, IRRIGATION, SODIUM CHLORIDE 0.9%, 1000 ML, POUR BOTTLE

## (undated) DEVICE — ADHESIVE, SKIN, LIQUIBAND EXCEED

## (undated) DEVICE — SUTURE, VICRYL, 3-0, 27 IN, SH

## (undated) DEVICE — TIP, SUCTION, YANKAUER, BULB, ADULT

## (undated) DEVICE — SUPPORT, MAMMARY, THERAPEUTIC, SURGI-BRA, LARGE, LF

## (undated) DEVICE — HEMOSTAT, ARISTA, ABSORBABLE, 3 GRAMS

## (undated) DEVICE — APPLICATOR, CHLORAPREP, W/ORANGE TINT, 26ML

## (undated) DEVICE — DRAPE, TIBURON, SPLIT SHEET, REINF ADH STRIP, 77X122

## (undated) DEVICE — DRAPE, INSTRUMENT, W/POUCH, STERI DRAPE, 7 X 11 IN, DISPOSABLE, STERILE

## (undated) DEVICE — DRAPE PACK, MINOR, CUSTOM, GEAUGA

## (undated) DEVICE — TUBING, SUCTION, CONNECTING, NON-CONDUCTIVE, SURE GRIP CONNECTORS, 3/16 IN X 10 FT

## (undated) DEVICE — APPLIER,  LIGACLIP MULTI CLIP, 30 MED 11 1/2

## (undated) DEVICE — CAUTERY, PENCIL, PUSH BUTTON, SMOKE EVAC, 70MM

## (undated) DEVICE — SUTURE, MONOCRYL, 4-0, 18 IN, PS2, UNDYED